# Patient Record
Sex: MALE | Race: WHITE | NOT HISPANIC OR LATINO | Employment: FULL TIME | URBAN - METROPOLITAN AREA
[De-identification: names, ages, dates, MRNs, and addresses within clinical notes are randomized per-mention and may not be internally consistent; named-entity substitution may affect disease eponyms.]

---

## 2021-08-23 ENCOUNTER — HOSPITAL ENCOUNTER (EMERGENCY)
Facility: MEDICAL CENTER | Age: 46
End: 2021-08-23
Attending: EMERGENCY MEDICINE

## 2021-08-23 VITALS
BODY MASS INDEX: 31.81 KG/M2 | HEIGHT: 73 IN | TEMPERATURE: 98.5 F | RESPIRATION RATE: 16 BRPM | HEART RATE: 84 BPM | WEIGHT: 240 LBS | OXYGEN SATURATION: 94 % | DIASTOLIC BLOOD PRESSURE: 87 MMHG | SYSTOLIC BLOOD PRESSURE: 138 MMHG

## 2021-08-23 DIAGNOSIS — L03.114 CELLULITIS OF LEFT UPPER EXTREMITY: ICD-10-CM

## 2021-08-23 PROCEDURE — 700102 HCHG RX REV CODE 250 W/ 637 OVERRIDE(OP): Performed by: EMERGENCY MEDICINE

## 2021-08-23 PROCEDURE — A9270 NON-COVERED ITEM OR SERVICE: HCPCS | Performed by: EMERGENCY MEDICINE

## 2021-08-23 PROCEDURE — 99283 EMERGENCY DEPT VISIT LOW MDM: CPT

## 2021-08-23 RX ORDER — SULFAMETHOXAZOLE AND TRIMETHOPRIM 800; 160 MG/1; MG/1
1 TABLET ORAL ONCE
Status: COMPLETED | OUTPATIENT
Start: 2021-08-23 | End: 2021-08-23

## 2021-08-23 RX ORDER — CEPHALEXIN 500 MG/1
500 CAPSULE ORAL 4 TIMES DAILY
Qty: 40 CAPSULE | Refills: 0 | Status: SHIPPED | OUTPATIENT
Start: 2021-08-23 | End: 2021-09-02

## 2021-08-23 RX ORDER — SULFAMETHOXAZOLE AND TRIMETHOPRIM 800; 160 MG/1; MG/1
1 TABLET ORAL 2 TIMES DAILY
Qty: 20 TABLET | Refills: 0 | Status: SHIPPED | OUTPATIENT
Start: 2021-08-23 | End: 2021-09-02

## 2021-08-23 RX ORDER — CEPHALEXIN 500 MG/1
500 CAPSULE ORAL ONCE
Status: COMPLETED | OUTPATIENT
Start: 2021-08-23 | End: 2021-08-23

## 2021-08-23 RX ADMIN — SULFAMETHOXAZOLE AND TRIMETHOPRIM 1 TABLET: 800; 160 TABLET ORAL at 19:51

## 2021-08-23 RX ADMIN — CEPHALEXIN 500 MG: 500 CAPSULE ORAL at 19:51

## 2021-08-23 NOTE — ED TRIAGE NOTES
Cedrick Modi    Chief Complaint   Patient presents with   • Bug Bite     PT states he was stung by a bee on his L forearm on saturday. PTs arm is red and slightly edemitous. CMS intact. Breathing is clear and unlabored.        Vitals:    08/23/21 1612   BP: 146/78   Pulse: 95   Resp: 16   Temp: 36.9 °C (98.4 °F)   SpO2: 94%       PT is ambulatory to triage with a steady gait. PT placed back into the lobby and educated on the triage process. PT told to inform staff of any changes,

## 2022-01-21 NOTE — ED NOTES
Faxed and confirmed.      ----- Message from Kurt Pena NP sent at 1/19/2022  5:49 PM EST -----  Regarding: records request  [facility]leaser request GYN records - from dr Manoj Sanchez / [facility]?  (Dr. Silvano Harrison is also in the same practice) near   [facility]. Reviewed discharge instructions with pt. Verbalized understanding. Pt ambulatory out of ER with steady gait.

## 2024-12-19 NOTE — ED PROVIDER NOTES
"ER Provider Note     Scribed for Cedrick Randle M.D. by Anna August. 8/23/2021, 7:26 PM.    Primary Care Provider: None  Means of Arrival: walking   History obtained from: Patient  History limited by: None     CHIEF COMPLAINT  Chief Complaint   Patient presents with   • Bug Bite     PT states he was stung by a bee on his L forearm on saturday. PTs arm is red and slightly edemitous. CMS intact. Breathing is clear and unlabored.        HPI  Cedrick Modi is a 45 y.o. male who presents to the Emergency Department for bug bite to left forearm onset two days ago. Patient was driving when a bee stung his arm. He has had pain since then with associated with redness and swelling. This has been worsening since then. Also reports chills.   No fever. Denies alcohol and illicit drugs. Is not on any medication.     REVIEW OF SYSTEMS  See HPI for further details.   All other systems are negative.     PAST MEDICAL HISTORY  None    SURGICAL HISTORY  patient denies any surgical history    SOCIAL HISTORY  Social History     Tobacco Use   • Smoking status: Current Every Day Smoker   • Smokeless tobacco: Never Used   Substance Use Topics   • Alcohol use: Not Currently   • Drug use: Never      Social History     Substance and Sexual Activity   Drug Use Never       FAMILY HISTORY  History reviewed. No pertinent family history.    CURRENT MEDICATIONS  None    ALLERGIES  Denies any allergies    PHYSICAL EXAM  VITAL SIGNS: /78   Pulse 95   Temp 36.9 °C (98.4 °F) (Oral)   Resp 16   Ht 1.854 m (6' 1\")   Wt 109 kg (240 lb)   SpO2 94%   BMI 31.66 kg/m²      Constitutional: Alert in no apparent distress.  HENT: No signs of trauma, Bilateral external ears normal, Nose normal.   Eyes: Pupils are equal and reactive, Conjunctiva normal, Non-icteric.   Neck: Normal range of motion, No tenderness, Supple, No stridor.   Lymphatic: No lymphadenopathy noted.   Cardiovascular: Regular rate and rhythm, no palpable thrill  Thorax " & Lungs: No respiratory distress,  No chest tenderness.   Abdomen: Bowel sounds normal, Soft, No tenderness, No masses, No pulsatile masses. No peritoneal signs.  Skin: Warm, Dry Redness at left forearm crease to left wrist, no fluctuance  Back: No bony tenderness, No CVA tenderness.   Extremities: Intact distal pulses, No cyanosis. Redness at left forearm crease to left wrist, no fluctuance, no crepitance  Musculoskeletal: Good range of motion in all major joints. Redness at left forearm crease to left wrist, no fluctuance, no crepitance  Neurologic: Alert , Normal motor function, Normal sensory function, No focal deficits noted.   Psychiatric: Affect normal, Judgment normal, Mood normal.       DIAGNOSTIC STUDIES / PROCEDURES  None    COURSE & MEDICAL DECISION MAKING  Pertinent Labs & Imaging studies reviewed. (See chart for details)    This is a 45 y.o. male that presents with what looks like cellulitis.  There is no crepitance or abscess.  I believe that this is amenable to oral antibiotics.  He does not appear septic.  I will discharge him home with antibiotics and strict return precautions..     7:26 PM - Patient seen and examined at bedside.  Patient will be medicated with 500mg Keflex, 800mg bactrim for his symptoms. .    The patient will return for new or worsening symptoms and is stable at the time of discharge.    The patient is referred to a primary physician for blood pressure management, diabetic screening, and for all other preventative health concerns.    DISPOSITION:  Patient will be discharged home in stable condition.    FOLLOW UP:  [address] [address] [phone]  Go in 2 days      OUTPATIENT MEDICATIONS:  Discharge Medication List as of 8/23/2021  7:56 PM      START taking these medications    Details   sulfamethoxazole-trimethoprim (BACTRIM DS) 800-160 MG tablet Take 1 Tablet by mouth 2 times a day for 10 days., Disp-20 Tablet, R-0, Print Rx Paper       cephALEXin (KEFLEX) 500 MG Cap Take 1 Capsule by mouth 4 times a day for 10 days., Disp-40 Capsule, R-0, Print Rx Paper             FINAL IMPRESSION  1. Cellulitis of left upper extremity          Anna RODRIGUEZ (Irma), am scribing for, and in the presence of, Cedrick Randle M.D..    Electronically signed by: Anna August (Irma), 8/23/2021    Cedrick RODRIGUEZ M.D. personally performed the services described in this documentation, as scribed by Nathan Tadeo in my presence, and it is both accurate and complete.     The note accurately reflects work and decisions made by me.  Cedrick Randle M.D.  8/24/2021  2:38 AM       Him/He